# Patient Record
Sex: MALE | Race: WHITE | NOT HISPANIC OR LATINO | URBAN - METROPOLITAN AREA
[De-identification: names, ages, dates, MRNs, and addresses within clinical notes are randomized per-mention and may not be internally consistent; named-entity substitution may affect disease eponyms.]

---

## 2023-10-30 ENCOUNTER — ANCILLARY PROCEDURE (OUTPATIENT)
Dept: RADIOLOGY | Facility: CLINIC | Age: 43
End: 2023-10-30
Payer: COMMERCIAL

## 2023-10-30 DIAGNOSIS — M47.26 OTHER SPONDYLOSIS WITH RADICULOPATHY, LUMBAR REGION: ICD-10-CM

## 2023-10-30 PROCEDURE — 72110 X-RAY EXAM L-2 SPINE 4/>VWS: CPT

## 2025-06-06 ENCOUNTER — OFFICE VISIT (OUTPATIENT)
Dept: URGENT CARE | Age: 45
End: 2025-06-06
Payer: COMMERCIAL

## 2025-06-06 VITALS
HEART RATE: 79 BPM | WEIGHT: 213 LBS | DIASTOLIC BLOOD PRESSURE: 77 MMHG | TEMPERATURE: 98.1 F | OXYGEN SATURATION: 98 % | SYSTOLIC BLOOD PRESSURE: 112 MMHG | RESPIRATION RATE: 18 BRPM

## 2025-06-06 DIAGNOSIS — J01.40 ACUTE NON-RECURRENT PANSINUSITIS: Primary | ICD-10-CM

## 2025-06-06 RX ORDER — AMOXICILLIN AND CLAVULANATE POTASSIUM 875; 125 MG/1; MG/1
875 TABLET, FILM COATED ORAL 2 TIMES DAILY
Qty: 14 TABLET | Refills: 0 | Status: SHIPPED | OUTPATIENT
Start: 2025-06-06 | End: 2025-06-13

## 2025-06-06 RX ORDER — VALACYCLOVIR HYDROCHLORIDE 1 G/1
TABLET, FILM COATED ORAL
COMMUNITY
Start: 2024-12-27

## 2025-06-06 ASSESSMENT — ENCOUNTER SYMPTOMS
EYE ITCHING: 0
DIZZINESS: 0
VOMITING: 0
EYE PAIN: 0
OCCASIONAL FEELINGS OF UNSTEADINESS: 0
WHEEZING: 0
DIARRHEA: 0
WEAKNESS: 0
EYE DISCHARGE: 0
SHORTNESS OF BREATH: 0
SORE THROAT: 0
SINUS PRESSURE: 1
STRIDOR: 0
LOSS OF SENSATION IN FEET: 0
DEPRESSION: 0
CHEST TIGHTNESS: 0
ABDOMINAL PAIN: 0
CHILLS: 0
HEADACHES: 0
FEVER: 0
COUGH: 1
FATIGUE: 0

## 2025-06-06 NOTE — PROGRESS NOTES
Subjective   Patient ID: Madhu Landon is a 45 y.o. male. They present today with a chief complaint of Nasal Congestion, Sinusitis, and Cough (1 month of symptoms ).    History of Present Illness  Patient is a 45-year-old male presenting to the clinic with complaints of runny nose, sinus congestion, sinus pressure, ear pressure, intermittent vertigo.  Symptoms have been present for 1 month now.  Patient states he got sick at the same time his wife and coworkers got sick.  Patient states that his wife and coworkers had Z-Jair's and got better.  Patient states he did not take any antibiotics.  Patient states he has had sinus congestion sinus pressure over the maxillary sinus ethmoid sinus and frontal sinus for the last month.  Patient states yesterday he did develop some vertigo symptoms when he woke up he denies any headache or dizziness he states kind of like motion sickness.  He states that he took a Dramamine like antihistamine and that symptoms improved.  Patient states he felt like it was time to be evaluated and treated for sinusitis.  No chest pain or shortness of breath or difficulty breathing      History provided by:  Patient  Sinusitis  Associated symptoms: congestion and cough    Associated symptoms: no chest pain, no chills, no ear pain, no fatigue, no fever, no headaches, no shortness of breath, no sore throat, no vomiting and no wheezing    Cough  Associated symptoms include postnasal drip. Pertinent negatives include no chest pain, chills, ear pain, fever, headaches, sore throat, shortness of breath or wheezing.       Past Medical History  Allergies as of 06/06/2025 - Reviewed 06/06/2025   Allergen Reaction Noted    Iodine Shortness of breath and Swelling 06/06/2025    Shellfish derived Anaphylaxis 07/03/2014       Prescriptions Prior to Admission[1]     Medical History[2]    Surgical History[3]     reports that he has never smoked. He has never been exposed to tobacco smoke. He has never used smokeless  tobacco. He reports that he does not drink alcohol and does not use drugs.    Review of Systems  Review of Systems   Constitutional:  Negative for chills, fatigue and fever.   HENT:  Positive for congestion, postnasal drip and sinus pressure. Negative for ear discharge, ear pain and sore throat.    Eyes:  Negative for pain, discharge and itching.   Respiratory:  Positive for cough. Negative for chest tightness, shortness of breath, wheezing and stridor.    Cardiovascular:  Negative for chest pain.   Gastrointestinal:  Negative for abdominal pain, diarrhea and vomiting.   Neurological:  Negative for dizziness, weakness and headaches.   All other systems reviewed and are negative.                                 Objective    Vitals:    06/06/25 1718   BP: 112/77   Pulse: 79   Resp: 18   Temp: 36.7 °C (98.1 °F)   TempSrc: Oral   SpO2: 98%   Weight: 96.6 kg (213 lb)     No LMP for male patient.    Physical Exam  Vitals reviewed.   Constitutional:       General: He is not in acute distress.     Appearance: Normal appearance. He is normal weight. He is not ill-appearing or toxic-appearing.   HENT:      Head: Normocephalic and atraumatic.      Right Ear: Ear canal and external ear normal.      Left Ear: Ear canal and external ear normal.      Ears:      Comments: Mild serous effusions bilaterally.     Nose: Congestion present.      Right Sinus: Maxillary sinus tenderness present.      Left Sinus: Maxillary sinus tenderness present.      Mouth/Throat:      Mouth: Mucous membranes are moist.      Pharynx: Oropharynx is clear. No oropharyngeal exudate or posterior oropharyngeal erythema.   Cardiovascular:      Rate and Rhythm: Normal rate and regular rhythm.      Heart sounds: Normal heart sounds. No murmur heard.     No friction rub. No gallop.   Pulmonary:      Effort: Pulmonary effort is normal. No respiratory distress.      Breath sounds: Normal breath sounds. No stridor. No wheezing, rhonchi or rales.   Musculoskeletal:          General: Normal range of motion.   Skin:     General: Skin is warm.   Neurological:      General: No focal deficit present.      Mental Status: He is alert and oriented to person, place, and time.      Cranial Nerves: No cranial nerve deficit.      Motor: No weakness.         Procedures    Point of Care Test & Imaging Results from this visit  No results found for this visit on 06/06/25.   Imaging  No results found.    Cardiology, Vascular, and Other Imaging  No other imaging results found for the past 2 days      Diagnostic study results (if any) were reviewed by [facility].    Assessment/Plan   Allergies, medications, history, and pertinent labs/EKGs/Imaging reviewed by Jae Larios PA-C.     Medical Decision Making:    Patient is a 45-year-old male presenting to the clinic with complaints of runny nose, sinus congestion, sinus pressure, ear pressure, intermittent vertigo.  Symptoms have been present for 1 month now.  Patient states he got sick at the same time his wife and coworkers got sick.  Patient states that his wife and coworkers had Z-Jair's and got better.  Patient states he did not take any antibiotics.  Patient states he has had sinus congestion sinus pressure over the maxillary sinus ethmoid sinus and frontal sinus for the last month.  Patient states yesterday he did develop some vertigo symptoms when he woke up he denies any headache or dizziness he states kind of like motion sickness.  He states that he took a Dramamine like antihistamine and that symptoms improved.  Patient states he felt like it was time to be evaluated and treated for sinusitis. No chest pain or shortness of breath or difficulty breathing.  Vital signs in the clinic are stable.  Physical examination as above.  Attending physician agrees symptoms are likely secondary to sinusitis will be treated with Augmentin.  Educated he cannot drive or operate heavy machinery if he ever has episodes of vertigo or if taking  Dramamine. Discharge instructions: Please follow up with your Primary Care Physician within the next 5-7 days. Will treat sinusitis with Augmentin.  If you develop any severe dizziness, worsening vertigo, no improvement over 48 to 72 hours go to the emergency room. It is important to take prescriptions as prescribed and complete all antibiotics. If your symptoms worsen you are instructed to immediately go to the emergency room for reevaluation and further assessment. If you develop any chest pain, SOB, or difficulty breathing you are instructed to go to the emergency room for reevaluation. All discharge instructions will be provided and explained to the patient at discharge. If you have any questions regarding your treatment plan please call the [facility] urgent care clinic.     Orders and Diagnoses  There are no diagnoses linked to this encounter.    Medical Admin Record      Patient disposition: Home    Electronically signed by [facility]  6:04 PM           [1] (Not in a hospital admission)  [2] No past medical history on file.  [3] No past surgical history on file.

## 2025-06-06 NOTE — PATIENT INSTRUCTIONS
Discharge instructions:    Please follow up with your Primary Care Physician within the next 5-7 days.    Will treat sinusitis with Augmentin.  If you develop any severe dizziness, worsening vertigo, no improvement over 48 to 72 hours go to the emergency room.    It is important to take prescriptions as prescribed and complete all antibiotics.     If your symptoms worsen you are instructed to immediately go to the emergency room for reevaluation and further assessment.    If you develop any chest pain, SOB, or difficulty breathing you are instructed to go to the emergency room for reevaluation.    All discharge instructions will be provided and explained to the patient at discharge.    If you have any questions regarding your treatment plan please call the [facility] urgent care clinic.

## 2025-06-07 ENCOUNTER — TELEPHONE (OUTPATIENT)
Dept: URGENT CARE | Age: 45
End: 2025-06-07

## 2025-06-13 ENCOUNTER — APPOINTMENT (OUTPATIENT)
Dept: AUDIOLOGY | Facility: CLINIC | Age: 45
End: 2025-06-13
Payer: COMMERCIAL

## 2025-06-13 DIAGNOSIS — R42 DIZZINESS: Primary | ICD-10-CM

## 2025-06-13 DIAGNOSIS — H90.3 SENSORINEURAL HEARING LOSS (SNHL) OF BOTH EARS: ICD-10-CM

## 2025-06-13 DIAGNOSIS — H93.13 TINNITUS OF BOTH EARS: ICD-10-CM

## 2025-06-13 PROCEDURE — 92557 COMPREHENSIVE HEARING TEST: CPT | Performed by: AUDIOLOGIST

## 2025-06-13 PROCEDURE — 92550 TYMPANOMETRY & REFLEX THRESH: CPT | Performed by: AUDIOLOGIST

## 2025-06-13 NOTE — PROGRESS NOTES
AUDIOLOGY ADULT AUDIOMETRIC EVALUATION    Name:  Madhu Landon  :  1980  Age:  45 y.o.  Date of Evaluation:  2025    Reason for visit: Mr. Landon is seen in the clinic today at the request of Fer Hdz MD in otolaryngology for an audiologic evaluation.     HISTORY  The patient reported experiencing episodes of dizziness for the past couple of weeks.  Constant bilateral tinnitus and bilateral aural fullness were also reported.  The patient has a history of occupational noise exposure.    EVALUATION  See scanned audiogram: “Media” > “Audiology Report”.      RESULTS  Otoscopic Evaluation:  Right Ear: clear ear canal  Left Ear: clear ear canal    Immittance Measures:  Tympanometry:  Right Ear: Type A, normal tympanic membrane mobility with normal middle ear pressure   Left Ear: Type A, normal tympanic membrane mobility with normal middle ear pressure     Acoustic Reflexes:  Ipsilateral Right Ear: Present at normal levels at 500 and 1000 Hz, absent at 2000 and 4000 Hz   Ipsilateral Left Ear: Present at normal levels at 500 and 1000 Hz, absent at 2000 and 4000 Hz   Contralateral Right Ear: did not evaluate  Contralateral Left Ear: did not evaluate    Distortion Product Otoacoustic Emissions (DPOAEs):  Right Ear: Present at 4306-3743 Hz; absent at 1000 Hz   Left Ear: Present at 5896-8480 Hz; absent at 1000 Hz     Audiometry:  Test Technique and Reliability:   Standard audiometry via supra-aural headphones. Reliability is good.    Pure tone air and bone conduction audiometry:  Right Ear: normal hearing except for a mild sensorineural hearing loss at 3307-1293 Hz  Left Ear: normal hearing except for a mild sensorineural hearing loss at 3000 and 6000 Hz    Speech Audiometry (Word Recognition Scores):   Right Ear: Excellent, 100%   Left Ear: Excellent, 100%     IMPRESSIONS  Results of today's audiometric evaluation revealed a mild high frequency sensorineural hearing loss in both ears.  No prior  audiologic evaluation is available for comparison.  Results of tympanometry testing indicated normal middle ear function bilaterally.   The presence of acoustic reflexes within normal intensity limits is consistent with normal middle ear and brainstem function, and suggests that auditory sensitivity is not significantly impaired. An elevated or absent acoustic reflex threshold is consistent with a middle ear disorder, hearing loss in the stimulated ear, and/or interruption of neural innervation of the stapedius muscle. Present DPOAEs suggest normal/near normal cochlear outer hair cell function and are consistent with no greater than a mild hearing loss at those frequencies. Absent DPOAEs are consistent with abnormal cochlear outer hair cell function at those frequencies.    RECOMMENDATIONS  - Follow up with otolaryngology as scheduled.  - Audiologic evaluation in conjunction with otologic care, if an acute change is noted, and/or annually.  - Wear hearing protection when exposed to excessive noise.   - Follow-up with medical care team as planned.    PATIENT EDUCATION  Discussed results, impressions and recommendations with the patient. Questions were addressed and the patient was encouraged to contact our office should concerns arise.    Time for this encounter: 3:15-3:45    Krystle Shah M.A., CCC-A   Licensed Audiologist

## 2025-06-17 ENCOUNTER — APPOINTMENT (OUTPATIENT)
Dept: OTOLARYNGOLOGY | Facility: CLINIC | Age: 45
End: 2025-06-17
Payer: COMMERCIAL

## 2025-06-17 VITALS — BODY MASS INDEX: 26.56 KG/M2 | HEIGHT: 74 IN | WEIGHT: 207 LBS

## 2025-06-17 DIAGNOSIS — H93.13 BILATERAL TINNITUS: ICD-10-CM

## 2025-06-17 DIAGNOSIS — R09.81 NASAL CONGESTION: ICD-10-CM

## 2025-06-17 DIAGNOSIS — H81.12 BPPV (BENIGN PAROXYSMAL POSITIONAL VERTIGO), LEFT: Primary | ICD-10-CM

## 2025-06-17 DIAGNOSIS — J34.2 DEVIATED NASAL SEPTUM: ICD-10-CM

## 2025-06-17 DIAGNOSIS — R42 VERTIGO: ICD-10-CM

## 2025-06-17 DIAGNOSIS — H90.3 BILATERAL SENSORINEURAL HEARING LOSS: ICD-10-CM

## 2025-06-17 PROCEDURE — 3008F BODY MASS INDEX DOCD: CPT | Performed by: OTOLARYNGOLOGY

## 2025-06-17 PROCEDURE — 99203 OFFICE O/P NEW LOW 30 MIN: CPT | Performed by: OTOLARYNGOLOGY

## 2025-06-17 PROCEDURE — 95992 CANALITH REPOSITIONING PROC: CPT | Performed by: OTOLARYNGOLOGY

## 2025-06-17 PROCEDURE — 1036F TOBACCO NON-USER: CPT | Performed by: OTOLARYNGOLOGY

## 2025-06-17 NOTE — PROGRESS NOTES
"Chief Complaint   Patient presents with    New Patient Visit     NP- DIZZINESS (HAD AUDIO 6/13), SINUS ISSUES     HPI:  Madhu Landon is a 45 y.o. male who presents with a history of vertigo.  About a month ago had quick episode lasting minutes to hours so some nausea and vomiting.    History of Present Illness  The patient presents with vertigo, sinus congestion, and tinnitus.    Intermittently June he had a severe vertigo episode after a week of sinus congestion without fever or vomiting. The vertigo occurred upon rising from a flat position, causing a wavy boat sensation, motion sickness, and nausea, leading to vomiting 6-7 times. The spinning lasted approximately 30 minutes, subsiding after vomiting. He missed work due to symptoms and felt unwell for 3-4 days with pressure but no spinning. He occasionally feels off when lying flat but without spinning. Similar episodes have occurred over the years, especially when lying flat, but none as severe. He props himself up while sleeping to prevent dizziness.    He reports tinnitus without significant hearing loss. A recent hearing test showed mild nerve hearing loss in high tones bilaterally. He uses a fan to mask the ringing.    He sought urgent care for a possible sinus infection and was prescribed Augmentin for a week but still has nasal congestion and suspects a deviated septum. He uses Flonase for severe congestion, which provides relief but causes throat irritation.    PMH:  Medical History[1]  Surgical History[2]      Medications:   Current Medications[3]     Allergies:  Allergies[4]     ROS:  Review of systems normal unless stated otherwise in the HPI and/or PMH.    Physical Exam:  Height 1.88 m (6' 2\"), weight 93.9 kg (207 lb). Body mass index is 26.58 kg/m².     GENERAL APPEARANCE: Well developed and well nourished.  Alert and oriented in no acute distress.  Normal vocal quality.      HEAD/FACE: No erythema or edema or facial tenderness.  Normal facial nerve " function bilaterally.    EAR:       EXTERNAL: Normal pinnas and external auditory canals without lesion or obstructing wax.       MIDDLE EAR: Tympanic membranes intact and mobile with normal landmarks.  Middle ear space appears well aerated.       TUBE STATUS: N/A       MASTOID CAVITY: N/A       HEARING: Gross hearing assessment is within normal limits.  Shawn-Hallpike positive for left posterior canal benign positional vertigo.      NOSE:       VISUALIZED USING: Anterior rhinoscopy with headlight and nasal speculum.       DORSUM: Midline, nontraumatic appearance.       MUCOSA: Normal-appearing.       SECRETIONS: Normal.       SEPTUM: Right deviation of the left columella show       INFERIOR TURBINATES: Normal.       MIDDLE TURBINATES/MEATUS: N/A       BLEEDING: N/A         ORAL CAVITY/PHARYNX:       TEETH: Adequate dentition.       TONGUE: No mass or lesion.  Normal mobility.       FLOOR OF MOUTH: No mass or lesion.       PALATE: Normal hard palate, soft palate, and uvula.       OROPHARYNX: Normal without mass or lesion.       BUCCAL MUCOSA/GBS: Normal without mass or lesion.       LIPS: Normal.    LARYNX/HYPOPHARYNX/NASOPHARYNX: N/A    NECK: No palpable masses or abnormal adenopathy.  Trachea is midline.    THYROID: No thyromegaly or palpable nodule.    SALIVARY GLANDS: Normal bilateral parotid and submandibular glands by inspection and palpation.    TMJ's: Normal.    NEURO: Cranial nerve exam grossly normal bilaterally.         Procedure:  Diagnosis left posterior canal benign positional vertigo  Epley maneuver performed in the standard fashion    Assessment/Plan   Madhu was seen today for new patient visit.  Diagnoses and all orders for this visit:  BPPV (benign paroxysmal positional vertigo), left (Primary)  Vertigo  Bilateral sensorineural hearing loss  Bilateral tinnitus  Deviated nasal septum  Nasal congestion     Left-sided benign positional vertigo treated today with Epley maneuver.  Provided verbal and  written education about this.  Also educated about his mild hearing loss and tinnitus.  Recommend hearing protection and masking techniques.  Has some congestion.  Flonase does help but does not use it because of some irritation in his throat.  Educated on proper usage.  If that is not helping and he were to wish to do something else he should follow-up.  Assessment & Plan      Follow up if symptoms worsen or fail to improve.     Fer Hdz MD    This medical note was created with the assistance of artificial intelligence (AI) for documentation purposes. The content has been reviewed and confirmed by the healthcare provider for accuracy and completeness. Patient consented to the use of audio recording and use of AI during their visit.          [1] History reviewed. No pertinent past medical history.  [2] History reviewed. No pertinent surgical history.  [3]   Current Outpatient Medications:     valACYclovir (Valtrex) 1 gram tablet, TAKE TWO TABLETS BY MOUTH EVERY 12 HOURS FOR ONE DAY- AND USE AS NEEDED: MAX QUANTITY 30 (Patient not taking: Reported on 6/17/2025), Disp: , Rfl:   [4]   Allergies  Allergen Reactions    Iodine Shortness of breath and Swelling    Shellfish Derived Anaphylaxis     iodine

## 2025-07-08 ENCOUNTER — OFFICE VISIT (OUTPATIENT)
Dept: OTOLARYNGOLOGY | Facility: CLINIC | Age: 45
End: 2025-07-08
Payer: COMMERCIAL

## 2025-07-08 VITALS — BODY MASS INDEX: 26.95 KG/M2 | HEIGHT: 74 IN | WEIGHT: 210 LBS

## 2025-07-08 DIAGNOSIS — J34.2 DEVIATED NASAL SEPTUM: ICD-10-CM

## 2025-07-08 DIAGNOSIS — H93.13 BILATERAL TINNITUS: ICD-10-CM

## 2025-07-08 DIAGNOSIS — H90.3 BILATERAL SENSORINEURAL HEARING LOSS: ICD-10-CM

## 2025-07-08 DIAGNOSIS — H81.12 BPPV (BENIGN PAROXYSMAL POSITIONAL VERTIGO), LEFT: Primary | ICD-10-CM

## 2025-07-08 PROCEDURE — 1036F TOBACCO NON-USER: CPT | Performed by: OTOLARYNGOLOGY

## 2025-07-08 PROCEDURE — 3008F BODY MASS INDEX DOCD: CPT | Performed by: OTOLARYNGOLOGY

## 2025-07-08 PROCEDURE — 99214 OFFICE O/P EST MOD 30 MIN: CPT | Performed by: OTOLARYNGOLOGY

## 2025-07-08 PROCEDURE — 95992 CANALITH REPOSITIONING PROC: CPT | Performed by: OTOLARYNGOLOGY

## 2025-07-08 NOTE — PROGRESS NOTES
"Chief Complaint   Patient presents with    Follow-up     EP- FOLLOW UP ON DIZZINESS     HPI:  Madhu Landon is a 45 y.o. male who presents in follow-up after having some vertigo and treatment for left posterior canal benign positional vertigo about 3 weeks ago.  He was really good for about a week and a half after we did that.  Essentially back to normal.  However at work as a  for the [employer] he does have to lay down to move his head in different directions and slowly his symptoms have come back.  Not quite as severe.  More positional.  But at times he just generally feels a little off with some lightheadedness and pressure  History of Present Illness      In June he had a severe vertigo episode after a week of sinus congestion without fever or vomiting. The vertigo occurred upon rising from a flat position, causing a wavy boat sensation, motion sickness, and nausea, leading to vomiting 6-7 times. The spinning lasted approximately 30 minutes, subsiding after vomiting. He missed work due to symptoms and felt unwell for 3-4 days with pressure but no spinning. He occasionally feels off when lying flat but without spinning. Similar episodes have occurred over the years, especially when lying flat, but none as severe. He props himself up while sleeping to prevent dizziness.    He reports tinnitus without significant hearing loss. A recent hearing test showed mild nerve hearing loss in high tones bilaterally. He uses a fan to mask the ringing.  I did personally review the audiogram      PMH:  Medical History[1]  Surgical History[2]      Medications:   Current Medications[3]     Allergies:  Allergies[4]     ROS:  Review of systems normal unless stated otherwise in the HPI and/or PMH.    Physical Exam:  Height 1.88 m (6' 2\"), weight 95.3 kg (210 lb). Body mass index is 26.96 kg/m².     GENERAL APPEARANCE: Well developed and well nourished.  Alert and oriented in no acute distress.  Normal vocal quality.  "     HEAD/FACE: No erythema or edema or facial tenderness.  Normal facial nerve function bilaterally.    EAR:       EXTERNAL: Normal pinnas and external auditory canals without lesion or obstructing wax.       MIDDLE EAR: Tympanic membranes intact and mobile with normal landmarks.  Middle ear space appears well aerated.       TUBE STATUS: N/A       MASTOID CAVITY: N/A       HEARING: Gross hearing assessment is within normal limits.  Shawn-Hallpike positive for left posterior canal benign positional vertigo.  Again      NOSE:       VISUALIZED USING: Anterior rhinoscopy with headlight and nasal speculum.       DORSUM: Midline, nontraumatic appearance.       MUCOSA: Normal-appearing.       SECRETIONS: Normal.       SEPTUM: Right deviation of the left columella show       INFERIOR TURBINATES: Normal.       MIDDLE TURBINATES/MEATUS: N/A       BLEEDING: N/A         ORAL CAVITY/PHARYNX:       TEETH: Adequate dentition.       TONGUE: No mass or lesion.  Normal mobility.       FLOOR OF MOUTH: No mass or lesion.       PALATE: Normal hard palate, soft palate, and uvula.       OROPHARYNX: Normal without mass or lesion.       BUCCAL MUCOSA/GBS: Normal without mass or lesion.       LIPS: Normal.    LARYNX/HYPOPHARYNX/NASOPHARYNX: N/A    NECK: No palpable masses or abnormal adenopathy.  Trachea is midline.    THYROID: No thyromegaly or palpable nodule.    SALIVARY GLANDS: Normal bilateral parotid and submandibular glands by inspection and palpation.    TMJ's: Normal.    NEURO: Cranial nerve exam grossly normal bilaterally.         Procedure:  Diagnosis left posterior canal benign positional vertigo  Epley maneuver performed in the standard fashion    Assessment/Plan   Madhu was seen today for follow-up.  Diagnoses and all orders for this visit:  BPPV (benign paroxysmal positional vertigo), left (Primary)  Bilateral sensorineural hearing loss  Bilateral tinnitus  Deviated nasal septum    Recurrent left-sided benign positional vertigo  treated today with Epley maneuver.  Provided verbal education about this.  He will call me if he still having some problems and we would likely arrange for further testing  Assessment & Plan      Follow up for Call with an update.     Fer Hdz MD             [1] History reviewed. No pertinent past medical history.  [2] History reviewed. No pertinent surgical history.  [3]   Current Outpatient Medications:     valACYclovir (Valtrex) 1 gram tablet, TAKE TWO TABLETS BY MOUTH EVERY 12 HOURS FOR ONE DAY- AND USE AS NEEDED: MAX QUANTITY 30 (Patient not taking: Reported on 6/6/2025), Disp: , Rfl:   [4]   Allergies  Allergen Reactions    Iodine Shortness of breath and Swelling    Shellfish Derived Anaphylaxis     iodine